# Patient Record
Sex: MALE | NOT HISPANIC OR LATINO | Employment: UNEMPLOYED | URBAN - METROPOLITAN AREA
[De-identification: names, ages, dates, MRNs, and addresses within clinical notes are randomized per-mention and may not be internally consistent; named-entity substitution may affect disease eponyms.]

---

## 2018-03-10 ENCOUNTER — RESOLUTE PROFESSIONAL BILLING HOSPITAL PROF FEE (OUTPATIENT)
Dept: HOSPITALIST | Facility: MEDICAL CENTER | Age: 28
End: 2018-03-10
Payer: MEDICAID

## 2018-03-10 ENCOUNTER — HOSPITAL ENCOUNTER (INPATIENT)
Facility: MEDICAL CENTER | Age: 28
LOS: 1 days | DRG: 189 | End: 2018-03-11
Attending: EMERGENCY MEDICINE | Admitting: FAMILY MEDICINE
Payer: MEDICAID

## 2018-03-10 ENCOUNTER — APPOINTMENT (OUTPATIENT)
Dept: RADIOLOGY | Facility: MEDICAL CENTER | Age: 28
DRG: 189 | End: 2018-03-10
Attending: EMERGENCY MEDICINE
Payer: MEDICAID

## 2018-03-10 DIAGNOSIS — J45.41 MODERATE PERSISTENT ASTHMA WITH ACUTE EXACERBATION: ICD-10-CM

## 2018-03-10 PROBLEM — D72.829 LEUKOCYTOSIS: Status: ACTIVE | Noted: 2018-03-10

## 2018-03-10 PROBLEM — E87.6 HYPOKALEMIA: Status: ACTIVE | Noted: 2018-03-10

## 2018-03-10 PROBLEM — J45.901 ASTHMA EXACERBATION: Status: ACTIVE | Noted: 2018-03-10

## 2018-03-10 PROBLEM — R60.0 BILATERAL LEG EDEMA: Status: ACTIVE | Noted: 2018-03-10

## 2018-03-10 PROBLEM — J96.01 ACUTE RESPIRATORY FAILURE WITH HYPOXIA (HCC): Status: ACTIVE | Noted: 2018-03-10

## 2018-03-10 LAB
ALBUMIN SERPL BCP-MCNC: 4.4 G/DL (ref 3.2–4.9)
ALBUMIN/GLOB SERPL: 1.2 G/DL
ALP SERPL-CCNC: 75 U/L (ref 30–99)
ALT SERPL-CCNC: 31 U/L (ref 2–50)
ANION GAP SERPL CALC-SCNC: 8 MMOL/L (ref 0–11.9)
AST SERPL-CCNC: 25 U/L (ref 12–45)
BASOPHILS # BLD AUTO: 0.6 % (ref 0–1.8)
BASOPHILS # BLD: 0.09 K/UL (ref 0–0.12)
BILIRUB SERPL-MCNC: 0.8 MG/DL (ref 0.1–1.5)
BNP SERPL-MCNC: 17 PG/ML (ref 0–100)
BUN SERPL-MCNC: 12 MG/DL (ref 8–22)
CALCIUM SERPL-MCNC: 9.1 MG/DL (ref 8.4–10.2)
CHLORIDE SERPL-SCNC: 104 MMOL/L (ref 96–112)
CO2 SERPL-SCNC: 24 MMOL/L (ref 20–33)
CREAT SERPL-MCNC: 0.84 MG/DL (ref 0.5–1.4)
EOSINOPHIL # BLD AUTO: 1.7 K/UL (ref 0–0.51)
EOSINOPHIL NFR BLD: 12 % (ref 0–6.9)
ERYTHROCYTE [DISTWIDTH] IN BLOOD BY AUTOMATED COUNT: 40.1 FL (ref 35.9–50)
FLUAV RNA SPEC QL NAA+PROBE: NEGATIVE
FLUBV RNA SPEC QL NAA+PROBE: NEGATIVE
GLOBULIN SER CALC-MCNC: 3.6 G/DL (ref 1.9–3.5)
GLUCOSE SERPL-MCNC: 121 MG/DL (ref 65–99)
HCT VFR BLD AUTO: 47 % (ref 42–52)
HGB BLD-MCNC: 16.3 G/DL (ref 14–18)
IMM GRANULOCYTES # BLD AUTO: 0.03 K/UL (ref 0–0.11)
IMM GRANULOCYTES NFR BLD AUTO: 0.2 % (ref 0–0.9)
LYMPHOCYTES # BLD AUTO: 1.83 K/UL (ref 1–4.8)
LYMPHOCYTES NFR BLD: 12.9 % (ref 22–41)
MCH RBC QN AUTO: 28.3 PG (ref 27–33)
MCHC RBC AUTO-ENTMCNC: 34.7 G/DL (ref 33.7–35.3)
MCV RBC AUTO: 81.6 FL (ref 81.4–97.8)
MONOCYTES # BLD AUTO: 1.04 K/UL (ref 0–0.85)
MONOCYTES NFR BLD AUTO: 7.4 % (ref 0–13.4)
NEUTROPHILS # BLD AUTO: 9.45 K/UL (ref 1.82–7.42)
NEUTROPHILS NFR BLD: 66.9 % (ref 44–72)
NRBC # BLD AUTO: 0 K/UL
NRBC BLD-RTO: 0 /100 WBC
PLATELET # BLD AUTO: 316 K/UL (ref 164–446)
PMV BLD AUTO: 9.2 FL (ref 9–12.9)
POTASSIUM SERPL-SCNC: 3.1 MMOL/L (ref 3.6–5.5)
PROCALCITONIN SERPL-MCNC: <0.05 NG/ML
PROT SERPL-MCNC: 8 G/DL (ref 6–8.2)
RBC # BLD AUTO: 5.76 M/UL (ref 4.7–6.1)
SODIUM SERPL-SCNC: 136 MMOL/L (ref 135–145)
TSH SERPL DL<=0.005 MIU/L-ACNC: 1.33 UIU/ML (ref 0.38–5.33)
WBC # BLD AUTO: 14.1 K/UL (ref 4.8–10.8)

## 2018-03-10 PROCEDURE — 84443 ASSAY THYROID STIM HORMONE: CPT

## 2018-03-10 PROCEDURE — A9270 NON-COVERED ITEM OR SERVICE: HCPCS | Performed by: FAMILY MEDICINE

## 2018-03-10 PROCEDURE — 96374 THER/PROPH/DIAG INJ IV PUSH: CPT

## 2018-03-10 PROCEDURE — 700111 HCHG RX REV CODE 636 W/ 250 OVERRIDE (IP): Performed by: FAMILY MEDICINE

## 2018-03-10 PROCEDURE — 96361 HYDRATE IV INFUSION ADD-ON: CPT

## 2018-03-10 PROCEDURE — 700111 HCHG RX REV CODE 636 W/ 250 OVERRIDE (IP): Performed by: EMERGENCY MEDICINE

## 2018-03-10 PROCEDURE — 99223 1ST HOSP IP/OBS HIGH 75: CPT | Performed by: FAMILY MEDICINE

## 2018-03-10 PROCEDURE — 700101 HCHG RX REV CODE 250: Performed by: EMERGENCY MEDICINE

## 2018-03-10 PROCEDURE — 84145 PROCALCITONIN (PCT): CPT

## 2018-03-10 PROCEDURE — 700105 HCHG RX REV CODE 258: Performed by: EMERGENCY MEDICINE

## 2018-03-10 PROCEDURE — 700102 HCHG RX REV CODE 250 W/ 637 OVERRIDE(OP): Performed by: FAMILY MEDICINE

## 2018-03-10 PROCEDURE — 700101 HCHG RX REV CODE 250: Performed by: FAMILY MEDICINE

## 2018-03-10 PROCEDURE — 87502 INFLUENZA DNA AMP PROBE: CPT

## 2018-03-10 PROCEDURE — 71045 X-RAY EXAM CHEST 1 VIEW: CPT

## 2018-03-10 PROCEDURE — 99285 EMERGENCY DEPT VISIT HI MDM: CPT

## 2018-03-10 PROCEDURE — 770020 HCHG ROOM/CARE - TELE (206)

## 2018-03-10 PROCEDURE — 94640 AIRWAY INHALATION TREATMENT: CPT

## 2018-03-10 PROCEDURE — 80053 COMPREHEN METABOLIC PANEL: CPT

## 2018-03-10 PROCEDURE — 83880 ASSAY OF NATRIURETIC PEPTIDE: CPT

## 2018-03-10 PROCEDURE — 85025 COMPLETE CBC W/AUTO DIFF WBC: CPT

## 2018-03-10 PROCEDURE — 304561 HCHG STAT O2

## 2018-03-10 RX ORDER — METHYLPREDNISOLONE SODIUM SUCCINATE 125 MG/2ML
125 INJECTION, POWDER, LYOPHILIZED, FOR SOLUTION INTRAMUSCULAR; INTRAVENOUS EVERY 6 HOURS
Status: DISCONTINUED | OUTPATIENT
Start: 2018-03-10 | End: 2018-03-11 | Stop reason: HOSPADM

## 2018-03-10 RX ORDER — AMOXICILLIN 250 MG
2 CAPSULE ORAL 2 TIMES DAILY
Status: DISCONTINUED | OUTPATIENT
Start: 2018-03-10 | End: 2018-03-11 | Stop reason: HOSPADM

## 2018-03-10 RX ORDER — ONDANSETRON 2 MG/ML
4 INJECTION INTRAMUSCULAR; INTRAVENOUS EVERY 4 HOURS PRN
Status: DISCONTINUED | OUTPATIENT
Start: 2018-03-10 | End: 2018-03-11 | Stop reason: HOSPADM

## 2018-03-10 RX ORDER — ONDANSETRON 4 MG/1
4 TABLET, ORALLY DISINTEGRATING ORAL EVERY 4 HOURS PRN
Status: DISCONTINUED | OUTPATIENT
Start: 2018-03-10 | End: 2018-03-11 | Stop reason: HOSPADM

## 2018-03-10 RX ORDER — POTASSIUM CHLORIDE 20 MEQ/1
20 TABLET, EXTENDED RELEASE ORAL DAILY
Status: DISCONTINUED | OUTPATIENT
Start: 2018-03-10 | End: 2018-03-11 | Stop reason: HOSPADM

## 2018-03-10 RX ORDER — ACETAMINOPHEN 325 MG/1
650 TABLET ORAL EVERY 6 HOURS PRN
Status: DISCONTINUED | OUTPATIENT
Start: 2018-03-10 | End: 2018-03-11 | Stop reason: HOSPADM

## 2018-03-10 RX ORDER — SODIUM CHLORIDE 9 MG/ML
1000 INJECTION, SOLUTION INTRAVENOUS ONCE
Status: COMPLETED | OUTPATIENT
Start: 2018-03-10 | End: 2018-03-10

## 2018-03-10 RX ORDER — PROMETHAZINE HYDROCHLORIDE 25 MG/1
12.5-25 SUPPOSITORY RECTAL EVERY 4 HOURS PRN
Status: DISCONTINUED | OUTPATIENT
Start: 2018-03-10 | End: 2018-03-11 | Stop reason: HOSPADM

## 2018-03-10 RX ORDER — IPRATROPIUM BROMIDE AND ALBUTEROL SULFATE 2.5; .5 MG/3ML; MG/3ML
3 SOLUTION RESPIRATORY (INHALATION)
Status: DISCONTINUED | OUTPATIENT
Start: 2018-03-10 | End: 2018-03-11 | Stop reason: HOSPADM

## 2018-03-10 RX ORDER — ACETAMINOPHEN 500 MG
1000 TABLET ORAL EVERY 6 HOURS PRN
COMMUNITY
End: 2018-04-24

## 2018-03-10 RX ORDER — BISACODYL 10 MG
10 SUPPOSITORY, RECTAL RECTAL
Status: DISCONTINUED | OUTPATIENT
Start: 2018-03-10 | End: 2018-03-11 | Stop reason: HOSPADM

## 2018-03-10 RX ORDER — METHYLPREDNISOLONE SODIUM SUCCINATE 125 MG/2ML
125 INJECTION, POWDER, LYOPHILIZED, FOR SOLUTION INTRAMUSCULAR; INTRAVENOUS ONCE
Status: COMPLETED | OUTPATIENT
Start: 2018-03-10 | End: 2018-03-10

## 2018-03-10 RX ORDER — PROMETHAZINE HYDROCHLORIDE 25 MG/1
12.5-25 TABLET ORAL EVERY 4 HOURS PRN
Status: DISCONTINUED | OUTPATIENT
Start: 2018-03-10 | End: 2018-03-11 | Stop reason: HOSPADM

## 2018-03-10 RX ORDER — POLYETHYLENE GLYCOL 3350 17 G/17G
1 POWDER, FOR SOLUTION ORAL
Status: DISCONTINUED | OUTPATIENT
Start: 2018-03-10 | End: 2018-03-11 | Stop reason: HOSPADM

## 2018-03-10 RX ADMIN — IPRATROPIUM BROMIDE 0.5 MG: 0.5 SOLUTION RESPIRATORY (INHALATION) at 09:59

## 2018-03-10 RX ADMIN — METHYLPREDNISOLONE SODIUM SUCCINATE 125 MG: 125 INJECTION, POWDER, FOR SOLUTION INTRAMUSCULAR; INTRAVENOUS at 17:24

## 2018-03-10 RX ADMIN — IPRATROPIUM BROMIDE AND ALBUTEROL SULFATE 3 ML: .5; 3 SOLUTION RESPIRATORY (INHALATION) at 19:30

## 2018-03-10 RX ADMIN — ALBUTEROL SULFATE 10 MG/HR: 5 SOLUTION RESPIRATORY (INHALATION) at 10:00

## 2018-03-10 RX ADMIN — ALBUTEROL SULFATE 2.5 MG: 2.5 SOLUTION RESPIRATORY (INHALATION) at 11:30

## 2018-03-10 RX ADMIN — SODIUM CHLORIDE 1000 ML: 9 INJECTION, SOLUTION INTRAVENOUS at 10:24

## 2018-03-10 RX ADMIN — POTASSIUM CHLORIDE 20 MEQ: 1500 TABLET, EXTENDED RELEASE ORAL at 14:11

## 2018-03-10 RX ADMIN — IPRATROPIUM BROMIDE AND ALBUTEROL SULFATE 3 ML: .5; 3 SOLUTION RESPIRATORY (INHALATION) at 15:03

## 2018-03-10 RX ADMIN — DOCUSATE SODIUM AND SENNOSIDES 2 TABLET: 8.6; 5 TABLET, FILM COATED ORAL at 20:44

## 2018-03-10 RX ADMIN — METHYLPREDNISOLONE SODIUM SUCCINATE 125 MG: 125 INJECTION, POWDER, FOR SOLUTION INTRAMUSCULAR; INTRAVENOUS at 10:24

## 2018-03-10 ASSESSMENT — PAIN SCALES - GENERAL: PAINLEVEL_OUTOF10: 0

## 2018-03-10 ASSESSMENT — COPD QUESTIONNAIRES
HAVE YOU SMOKED AT LEAST 100 CIGARETTES IN YOUR ENTIRE LIFE: NO/DON'T KNOW
DO YOU EVER COUGH UP ANY MUCUS OR PHLEGM?: NO/ONLY WITH OCCASIONAL COLDS OR INFECTIONS
COPD SCREENING SCORE: 0
DURING THE PAST 4 WEEKS HOW MUCH DID YOU FEEL SHORT OF BREATH: NONE/LITTLE OF THE TIME

## 2018-03-10 ASSESSMENT — ENCOUNTER SYMPTOMS
FEVER: 0
SHORTNESS OF BREATH: 1
HEARTBURN: 0
COUGH: 0
CHILLS: 0
WHEEZING: 1
VOMITING: 0
DIZZINESS: 0
SPUTUM PRODUCTION: 0
ABDOMINAL PAIN: 0
WEAKNESS: 0
DIARRHEA: 0
SORE THROAT: 0
BLURRED VISION: 0
NAUSEA: 0
STRIDOR: 0

## 2018-03-10 ASSESSMENT — LIFESTYLE VARIABLES
TOTAL SCORE: 0
ON A TYPICAL DAY WHEN YOU DRINK ALCOHOL HOW MANY DRINKS DO YOU HAVE: 1
EVER_SMOKED: NEVER
HAVE PEOPLE ANNOYED YOU BY CRITICIZING YOUR DRINKING: NO
EVER_SMOKED: NEVER
TOTAL SCORE: 0
ALCOHOL_USE: YES
CONSUMPTION TOTAL: NEGATIVE
AVERAGE NUMBER OF DAYS PER WEEK YOU HAVE A DRINK CONTAINING ALCOHOL: 1
TOTAL SCORE: 0
EVER HAD A DRINK FIRST THING IN THE MORNING TO STEADY YOUR NERVES TO GET RID OF A HANGOVER: NO
HOW MANY TIMES IN THE PAST YEAR HAVE YOU HAD 5 OR MORE DRINKS IN A DAY: 0
EVER FELT BAD OR GUILTY ABOUT YOUR DRINKING: NO
HAVE YOU EVER FELT YOU SHOULD CUT DOWN ON YOUR DRINKING: NO

## 2018-03-10 ASSESSMENT — PATIENT HEALTH QUESTIONNAIRE - PHQ9
2. FEELING DOWN, DEPRESSED, IRRITABLE, OR HOPELESS: NOT AT ALL
1. LITTLE INTEREST OR PLEASURE IN DOING THINGS: NOT AT ALL
SUM OF ALL RESPONSES TO PHQ9 QUESTIONS 1 AND 2: 0
SUM OF ALL RESPONSES TO PHQ QUESTIONS 1-9: 0

## 2018-03-10 NOTE — ED PROVIDER NOTES
ED Provider Note    CHIEF COMPLAINT  Chief Complaint   Patient presents with   • Shortness of Breath     in the mornings for 2 days, HX of asthma, took INH today without relief   • Headache     in the morning on waking for 2 days        HPI  Micky Doherty is a 27 y.o. male who presents to the ED with complaints of shortness of breath. Over the past 2 days, been having increasing shortness of breath, especially when he wakes up in the morning. Patient does have a distant history of asthma. Does have a single her inhaler, but it does not seem to be helping him. Patient presents today because of increasing shortness breath. Denies any fevers, chills, just feels very short of breath, is having a hard time breathing. Patient denies any other symptoms. Presents for evaluation.    REVIEW OF SYSTEMS  See HPI for further details. All other systems are negative.     PAST MEDICAL HISTORY  Past Medical History:   Diagnosis Date   • Asthma        FAMILY HISTORY  History reviewed. No pertinent family history.  Patient's family history has been discussed and is been found to be noncontributory to his present illness  SOCIAL HISTORY  Social History     Social History   • Marital status: Single     Spouse name: N/A   • Number of children: N/A   • Years of education: N/A     Social History Main Topics   • Smoking status: Never Smoker   • Smokeless tobacco: Former User     Quit date: 3/13/2014   • Alcohol use Yes      Comment: occasional   • Drug use: No   • Sexual activity: Not on file     Other Topics Concern   • Not on file     Social History Narrative   • No narrative on file      Pcp Pt States None  The patient denies any significant tobacco, alcohol or drug use    SURGICAL HISTORY  Past Surgical History:   Procedure Laterality Date   • OTHER  2006    no tear production   • OTHER      tonsillectomy       CURRENT MEDICATIONS  Home Medications     Reviewed by Janette Malloy (Pharmacy Tech) on 03/10/18 at 0901  Med List  "Status: Complete   Medication Last Dose Status   acetaminophen (TYLENOL) 500 MG Tab 3/9/2018 Active   NON SPECIFIED 3/10/2018 Active                ALLERGIES  No Known Allergies    PHYSICAL EXAM  VITAL SIGNS: /72   Pulse 94   Temp 37.5 °C (99.5 °F)   Resp (!) 24   Ht 1.753 m (5' 9\")   Wt 85.7 kg (188 lb 15 oz)   SpO2 94%   BMI 27.90 kg/m²    Pulse Oximetry was obtained. It showed a reading of Pulse Oximetry: (!) 85 %.  I interpreted this as hypoxic at 85%.     Constitutional: Well developed, Well nourished, No acute distress, Non-toxic appearance.   HENT: Normocephalic, Atraumatic, Bilateral external ears normal, bilateral tympanic membranes normal, Oropharynx moist, No oral exudates, Nose normal.   Eyes: Pupils are equal round and react to light, extraocular motions are intact, conjunctiva is normal, there are no signs of exudate.   Neck: Supple, no cervical lymphadenopathy, no meningeal signs..   Lymphatic: No lymphadenopathy noted.   Cardiovascular: Heart rate regular rhythm without murmurs, gallops or rubs  Thorax & Lungs: Diminished throughout with extra wheezes throughout.  Abdomen: Soft, nontender, nondistended. Bowel sounds are present  Skin: Warm, Dry, No erythema,   Back: No tenderness, No CVA tenderness.   Extremities: Intact distal pulses, no clubbing, no cyanosis, no edema, nontender.. Negative Homans sign  Neurologic: Alert & oriented x 3, Normal motor function, Normal sensory function, No focal deficits noted.       RADIOLOGY/PROCEDURES  DX-CHEST-PORTABLE (1 VIEW)   Final Result      No evidence of acute cardiopulmonary process.          Results for orders placed or performed during the hospital encounter of 03/10/18   CBC w/ Differential   Result Value Ref Range    WBC 14.1 (H) 4.8 - 10.8 K/uL    RBC 5.76 4.70 - 6.10 M/uL    Hemoglobin 16.3 14.0 - 18.0 g/dL    Hematocrit 47.0 42.0 - 52.0 %    MCV 81.6 81.4 - 97.8 fL    MCH 28.3 27.0 - 33.0 pg    MCHC 34.7 33.7 - 35.3 g/dL    RDW 40.1 " 35.9 - 50.0 fL    Platelet Count 316 164 - 446 K/uL    MPV 9.2 9.0 - 12.9 fL    Neutrophils-Polys 66.90 44.00 - 72.00 %    Lymphocytes 12.90 (L) 22.00 - 41.00 %    Monocytes 7.40 0.00 - 13.40 %    Eosinophils 12.00 (H) 0.00 - 6.90 %    Basophils 0.60 0.00 - 1.80 %    Immature Granulocytes 0.20 0.00 - 0.90 %    Nucleated RBC 0.00 /100 WBC    Neutrophils (Absolute) 9.45 (H) 1.82 - 7.42 K/uL    Lymphs (Absolute) 1.83 1.00 - 4.80 K/uL    Monos (Absolute) 1.04 (H) 0.00 - 0.85 K/uL    Eos (Absolute) 1.70 (H) 0.00 - 0.51 K/uL    Baso (Absolute) 0.09 0.00 - 0.12 K/uL    Immature Granulocytes (abs) 0.03 0.00 - 0.11 K/uL    NRBC (Absolute) 0.00 K/uL   Complete Metabolic Panel (CMP)   Result Value Ref Range    Sodium 136 135 - 145 mmol/L    Potassium 3.1 (L) 3.6 - 5.5 mmol/L    Chloride 104 96 - 112 mmol/L    Co2 24 20 - 33 mmol/L    Anion Gap 8.0 0.0 - 11.9    Glucose 121 (H) 65 - 99 mg/dL    Bun 12 8 - 22 mg/dL    Creatinine 0.84 0.50 - 1.40 mg/dL    Calcium 9.1 8.4 - 10.2 mg/dL    AST(SGOT) 25 12 - 45 U/L    ALT(SGPT) 31 2 - 50 U/L    Alkaline Phosphatase 75 30 - 99 U/L    Total Bilirubin 0.8 0.1 - 1.5 mg/dL    Albumin 4.4 3.2 - 4.9 g/dL    Total Protein 8.0 6.0 - 8.2 g/dL    Globulin 3.6 (H) 1.9 - 3.5 g/dL    A-G Ratio 1.2 g/dL   ESTIMATED GFR   Result Value Ref Range    GFR If African American >60 >60 mL/min/1.73 m 2    GFR If Non African American >60 >60 mL/min/1.73 m 2           COURSE & MEDICAL DECISION MAKING  Pertinent Labs & Imaging studies reviewed. (See chart for details)  Patient presents for evaluation. Clinically, the patient is rather tachypneic and very tight on examination. The patient started with continues to less treatment of albuterol with one dose of Atrovent. She also started with a liter bolus of fluids because he is dehydrated based on his tachycardia as well as his dry mucous membranes. After the fluid bolus, steroids, and initial treatment. He states he is feeling improved, but oxygen saturations  still remained about 87-88. Lung examination, he still has significant extra wheezes throughout. At this point, I do for the patient will require admission to the hospital for further treatment and care.    FINAL IMPRESSION  1. Moderate persistent asthma with acute exacerbation             Electronically signed by: Tanner Foley, 3/10/2018 9:45 AM

## 2018-03-10 NOTE — ED NOTES
"Chief Complaint   Patient presents with   • Shortness of Breath     in the mornings for 2 days, HX of asthma, took INH today without relief   • Headache     in the morning on waking for 2 days     /72   Pulse (!) 101   Temp 37.5 °C (99.5 °F)   Resp (!) 22   Ht 1.753 m (5' 9\")   Wt 85.7 kg (188 lb 15 oz)   SpO2 (!) 85%   BMI 27.90 kg/m²     "

## 2018-03-10 NOTE — ED NOTES
The Medication Reconciliation process has been completed by interviewing the patient    Allergies have been reviewed  Antibiotic use in 30 days - none  Home Pharmacy:  [facility] - [place]

## 2018-03-10 NOTE — H&P
Hospital Medicine History and Physical    Date of Service  3/10/2018    Chief Complaint  Chief Complaint   Patient presents with   • Shortness of Breath     in the mornings for 2 days, HX of asthma, took INH today without relief   • Headache     in the morning on waking for 2 days       History of Presenting Illness  27 y.o. male who presented 3/10/2018 with Shortness of breath and wheezing for the past 2-3 days. He denies any fever or chills, cough or congestion, chest pain, abdominal pain nausea or vomiting or diarrhea. Patient states he did not get the flu shot this year. Influenza is still pending at the spine. He has known history of asthma, he has been using his rescue inhaler almost every 4-6 hours for the past 2-3 days without any relief. Prior to that patient states he has been using his inhaler every day especially during the winter season and for the past 6 months. He has no history of hospitalization for asthma exacerbation or any history of intubation or ICU admission. His oxygen saturation was noted to be 86% room air.    Primary Care Physician  Pcp Pt States None    Consultants  None    Code Status  Full    Review of Systems  Review of Systems   Constitutional: Negative for chills, fever and malaise/fatigue.   HENT: Negative for hearing loss and sore throat.    Eyes: Negative for blurred vision.   Respiratory: Positive for shortness of breath and wheezing. Negative for cough, sputum production and stridor.    Cardiovascular: Negative for chest pain and leg swelling.   Gastrointestinal: Negative for abdominal pain, diarrhea, heartburn, nausea and vomiting.   Genitourinary: Negative for dysuria.   Neurological: Negative for dizziness and weakness.        Past Medical History  Past Medical History:   Diagnosis Date   • Asthma        Surgical History  Past Surgical History:   Procedure Laterality Date   • OTHER  2006    no tear production   • OTHER      tonsillectomy       Medications  No current  facility-administered medications on file prior to encounter.      No current outpatient prescriptions on file prior to encounter.       Family History  Family History   Problem Relation Age of Onset   • Family history unknown: Yes       Social History  Social History   Substance Use Topics   • Smoking status: Never Smoker   • Smokeless tobacco: Former User     Quit date: 3/13/2014   • Alcohol use Yes      Comment: occasional       Allergies  No Known Allergies     Physical Exam  Laboratory   Hemodynamics  Temp (24hrs), Av.5 °C (99.5 °F), Min:37.5 °C (99.5 °F), Max:37.5 °C (99.5 °F)   Temperature: 37.5 °C (99.5 °F)  Pulse  Av.3  Min: 92  Max: 103    Blood Pressure: 130/72, NIBP: 123/80      Respiratory      Respiration: (!) 24, Pulse Oximetry: 90 %, O2 Daily Delivery Respiratory : Room Air with O2 Available     Given By:: Mouthpiece, #Bronchodilator: Yes, Work Of Breathing / Effort: Accessory Muscle Use;Increased Work of Breathing  RUL Breath Sounds: Expiratory Wheezes, RML Breath Sounds: Expiratory Wheezes, RLL Breath Sounds: Diminished, LEN Breath Sounds: Expiratory Wheezes, LLL Breath Sounds: Expiratory Wheezes    Physical Exam   Constitutional: He is oriented to person, place, and time. He appears well-developed and well-nourished.   HENT:   Head: Normocephalic and atraumatic.   Eyes: Conjunctivae are normal. Pupils are equal, round, and reactive to light.   Neck: No tracheal deviation present. No thyromegaly present.   Cardiovascular: Normal rate and regular rhythm.    Pulmonary/Chest: Accessory muscle usage present. He has decreased breath sounds. He has wheezes in the right upper field, the right middle field, the right lower field, the left upper field, the left middle field and the left lower field. He has no rales.   Abdominal: Soft. Bowel sounds are normal. He exhibits no distension. There is no tenderness.   Musculoskeletal: He exhibits edema.   Lymphadenopathy:     He has no cervical  adenopathy.   Neurological: He is alert and oriented to person, place, and time.   Skin: Skin is warm and dry.   Nursing note and vitals reviewed.      Recent Labs      03/10/18   0945   WBC  14.1*   RBC  5.76   HEMOGLOBIN  16.3   HEMATOCRIT  47.0   MCV  81.6   MCH  28.3   MCHC  34.7   RDW  40.1   PLATELETCT  316   MPV  9.2     Recent Labs      03/10/18   0945   SODIUM  136   POTASSIUM  3.1*   CHLORIDE  104   CO2  24   GLUCOSE  121*   BUN  12   CREATININE  0.84   CALCIUM  9.1     Recent Labs      03/10/18   0945   ALTSGPT  31   ASTSGOT  25   ALKPHOSPHAT  75   TBILIRUBIN  0.8   GLUCOSE  121*                 No results found for: TROPONINI  Urinalysis:    Lab Results  Component Value Date/Time   SPECGRAVITY 1.025 04/03/2013 1545   GLUCOSEUR Negative 04/03/2013 1545   KETONES >=80 (A) 04/03/2013 1545   NITRITE Negative 04/03/2013 1545   WBCURINE 2-5 (A) 04/03/2013 1545   BACTERIA Few (A) 04/03/2013 1545   EPITHELCELL Few 04/03/2013 1545        Imaging    CXR  No evidence of acute cardiopulmonary process   Assessment/Plan     I anticipate this patient will require at least two midnights for appropriate medical management, necessitating inpatient admission.    Acute respiratory failure with hypoxia (CMS-HCC)- (present on admission)   Assessment & Plan    Keep O2 sats above 90%  Encourage incentive spirometer        Asthma exacerbation- (present on admission)   Assessment & Plan    IV Solu-Medrol, RT protocol  Likely need maintenance inhaler        Bilateral leg edema- (present on admission)   Assessment & Plan    Check BNP, echocardiogram        Hypokalemia- (present on admission)   Assessment & Plan    Start K Dur, follow BMP, magnesium        Leukocytosis- (present on admission)   Assessment & Plan    Check pro calcitonin            VTE prophylaxis: Lovenox.

## 2018-03-10 NOTE — CARE PLAN
Problem: Oxygenation:  Goal: Maintain adequate oxygenation dependent on patient condition  Outcome: PROGRESSING AS EXPECTED  Monitor oxygenation for SpO2 >90%  Intervention: Manage oxygen therapy by monitoring pulse oximetry and/or ABG values  Oxygen is currently required at 3 lpm nasal cannula for SpO2 >90%  Intervention: Levels of oxygenation will improve to baseline  Patient did not require oxygen at home prior to this admission.       Problem: Bronchoconstriction:  Goal: Improve in air movement and diminished wheezing  Outcome: PROGRESSING AS EXPECTED  Patient does claim a benefit from bronchodilator therapy. There was a increase in aeration and a decrease in wheezes heard  Intervention: Implement inhaled treatments  Patient is receiving Duoneb Q4.  Intervention: Evaluate and manage medication effects  Patient will be evaluated before and after medication delivery to assess effectiveness of therapy. Currently there seems to be both subjective and objective improvement.

## 2018-03-10 NOTE — ED NOTES
Pt scored 3 on sepsis criteria, MD at bedside to evaluate. MD feels this is asthma exacerbation, pt non-febrile. Declines sepsis work-up at this time.

## 2018-03-10 NOTE — PROGRESS NOTES
Pt to floor.  Pt is alert and oriented.  Denies SOB at this time.  Pt currently on 3 L NC.  Updated pt w/ POC.  Water given.  Will continue with care.

## 2018-03-11 VITALS
RESPIRATION RATE: 20 BRPM | BODY MASS INDEX: 27.98 KG/M2 | SYSTOLIC BLOOD PRESSURE: 126 MMHG | DIASTOLIC BLOOD PRESSURE: 72 MMHG | WEIGHT: 188.93 LBS | HEIGHT: 69 IN | TEMPERATURE: 97.5 F | HEART RATE: 108 BPM | OXYGEN SATURATION: 92 %

## 2018-03-11 LAB
ANION GAP SERPL CALC-SCNC: 8 MMOL/L (ref 0–11.9)
BASOPHILS # BLD AUTO: 0.1 % (ref 0–1.8)
BASOPHILS # BLD: 0.01 K/UL (ref 0–0.12)
BUN SERPL-MCNC: 14 MG/DL (ref 8–22)
CALCIUM SERPL-MCNC: 9.5 MG/DL (ref 8.4–10.2)
CHLORIDE SERPL-SCNC: 111 MMOL/L (ref 96–112)
CO2 SERPL-SCNC: 22 MMOL/L (ref 20–33)
CREAT SERPL-MCNC: 0.68 MG/DL (ref 0.5–1.4)
EOSINOPHIL # BLD AUTO: 0 K/UL (ref 0–0.51)
EOSINOPHIL NFR BLD: 0 % (ref 0–6.9)
ERYTHROCYTE [DISTWIDTH] IN BLOOD BY AUTOMATED COUNT: 41.9 FL (ref 35.9–50)
GLUCOSE SERPL-MCNC: 156 MG/DL (ref 65–99)
HCT VFR BLD AUTO: 44.6 % (ref 42–52)
HGB BLD-MCNC: 15.3 G/DL (ref 14–18)
IMM GRANULOCYTES # BLD AUTO: 0.05 K/UL (ref 0–0.11)
IMM GRANULOCYTES NFR BLD AUTO: 0.5 % (ref 0–0.9)
LV EJECT FRACT  99904: 70
LV EJECT FRACT MOD 2C 99903: 64.39
LV EJECT FRACT MOD 4C 99902: 78.45
LV EJECT FRACT MOD BP 99901: 71.64
LYMPHOCYTES # BLD AUTO: 0.8 K/UL (ref 1–4.8)
LYMPHOCYTES NFR BLD: 7.4 % (ref 22–41)
MAGNESIUM SERPL-MCNC: 2.1 MG/DL (ref 1.5–2.5)
MCH RBC QN AUTO: 28.5 PG (ref 27–33)
MCHC RBC AUTO-ENTMCNC: 34.3 G/DL (ref 33.7–35.3)
MCV RBC AUTO: 83.1 FL (ref 81.4–97.8)
MONOCYTES # BLD AUTO: 0.11 K/UL (ref 0–0.85)
MONOCYTES NFR BLD AUTO: 1 % (ref 0–13.4)
NEUTROPHILS # BLD AUTO: 9.79 K/UL (ref 1.82–7.42)
NEUTROPHILS NFR BLD: 91 % (ref 44–72)
NRBC # BLD AUTO: 0 K/UL
NRBC BLD-RTO: 0 /100 WBC
PLATELET # BLD AUTO: 338 K/UL (ref 164–446)
PMV BLD AUTO: 9.8 FL (ref 9–12.9)
POTASSIUM SERPL-SCNC: 4.1 MMOL/L (ref 3.6–5.5)
RBC # BLD AUTO: 5.37 M/UL (ref 4.7–6.1)
SODIUM SERPL-SCNC: 141 MMOL/L (ref 135–145)
WBC # BLD AUTO: 10.8 K/UL (ref 4.8–10.8)

## 2018-03-11 PROCEDURE — 36415 COLL VENOUS BLD VENIPUNCTURE: CPT

## 2018-03-11 PROCEDURE — 700101 HCHG RX REV CODE 250: Performed by: FAMILY MEDICINE

## 2018-03-11 PROCEDURE — 80048 BASIC METABOLIC PNL TOTAL CA: CPT

## 2018-03-11 PROCEDURE — 99239 HOSP IP/OBS DSCHRG MGMT >30: CPT | Performed by: INTERNAL MEDICINE

## 2018-03-11 PROCEDURE — 93306 TTE W/DOPPLER COMPLETE: CPT | Mod: 26 | Performed by: INTERNAL MEDICINE

## 2018-03-11 PROCEDURE — 93306 TTE W/DOPPLER COMPLETE: CPT

## 2018-03-11 PROCEDURE — 85025 COMPLETE CBC W/AUTO DIFF WBC: CPT

## 2018-03-11 PROCEDURE — 94760 N-INVAS EAR/PLS OXIMETRY 1: CPT

## 2018-03-11 PROCEDURE — 700102 HCHG RX REV CODE 250 W/ 637 OVERRIDE(OP): Performed by: INTERNAL MEDICINE

## 2018-03-11 PROCEDURE — 83735 ASSAY OF MAGNESIUM: CPT

## 2018-03-11 PROCEDURE — 700111 HCHG RX REV CODE 636 W/ 250 OVERRIDE (IP): Performed by: FAMILY MEDICINE

## 2018-03-11 PROCEDURE — 94640 AIRWAY INHALATION TREATMENT: CPT

## 2018-03-11 PROCEDURE — A9270 NON-COVERED ITEM OR SERVICE: HCPCS | Performed by: INTERNAL MEDICINE

## 2018-03-11 RX ORDER — FLUTICASONE PROPIONATE 220 UG/1
1 AEROSOL, METERED RESPIRATORY (INHALATION)
Status: DISCONTINUED | OUTPATIENT
Start: 2018-03-11 | End: 2018-03-11 | Stop reason: HOSPADM

## 2018-03-11 RX ORDER — PREDNISONE 10 MG/1
TABLET ORAL
Qty: 36 TAB | Refills: 0 | Status: SHIPPED | OUTPATIENT
Start: 2018-03-11 | End: 2018-04-24

## 2018-03-11 RX ORDER — FLUTICASONE PROPIONATE 220 UG/1
1 AEROSOL, METERED RESPIRATORY (INHALATION) EVERY 12 HOURS
Qty: 1 INHALER | Refills: 2 | Status: SHIPPED | OUTPATIENT
Start: 2018-03-11 | End: 2018-03-11

## 2018-03-11 RX ORDER — FLUTICASONE PROPIONATE 220 UG/1
1 AEROSOL, METERED RESPIRATORY (INHALATION) EVERY 12 HOURS
Qty: 1 INHALER | Refills: 2 | Status: SHIPPED | OUTPATIENT
Start: 2018-03-11

## 2018-03-11 RX ORDER — ALBUTEROL SULFATE 90 UG/1
2 AEROSOL, METERED RESPIRATORY (INHALATION) EVERY 6 HOURS PRN
Qty: 8.5 G | Refills: 0 | Status: SHIPPED | OUTPATIENT
Start: 2018-03-11 | End: 2018-04-24

## 2018-03-11 RX ORDER — BUDESONIDE AND FORMOTEROL FUMARATE DIHYDRATE 80; 4.5 UG/1; UG/1
2 AEROSOL RESPIRATORY (INHALATION)
Status: DISCONTINUED | OUTPATIENT
Start: 2018-03-11 | End: 2018-03-11

## 2018-03-11 RX ADMIN — METHYLPREDNISOLONE SODIUM SUCCINATE 125 MG: 125 INJECTION, POWDER, FOR SOLUTION INTRAMUSCULAR; INTRAVENOUS at 06:21

## 2018-03-11 RX ADMIN — METHYLPREDNISOLONE SODIUM SUCCINATE 125 MG: 125 INJECTION, POWDER, FOR SOLUTION INTRAMUSCULAR; INTRAVENOUS at 00:02

## 2018-03-11 RX ADMIN — METHYLPREDNISOLONE SODIUM SUCCINATE 125 MG: 125 INJECTION, POWDER, FOR SOLUTION INTRAMUSCULAR; INTRAVENOUS at 12:14

## 2018-03-11 RX ADMIN — IPRATROPIUM BROMIDE AND ALBUTEROL SULFATE 3 ML: .5; 3 SOLUTION RESPIRATORY (INHALATION) at 15:45

## 2018-03-11 RX ADMIN — IPRATROPIUM BROMIDE AND ALBUTEROL SULFATE 3 ML: .5; 3 SOLUTION RESPIRATORY (INHALATION) at 07:32

## 2018-03-11 RX ADMIN — IPRATROPIUM BROMIDE AND ALBUTEROL SULFATE 3 ML: .5; 3 SOLUTION RESPIRATORY (INHALATION) at 11:01

## 2018-03-11 ASSESSMENT — PAIN SCALES - GENERAL: PAINLEVEL_OUTOF10: 0

## 2018-03-11 NOTE — FLOWSHEET NOTE
03/10/18 1931   Events/Summary/Plan   Events/Summary/Plan SVN   Interdisciplinary Plan of Care-Goals (Indications)   Obstructive Ventilatory Defect or Pulmonary Disease without Obvious Obstruction History / Diagnosis   Interdisciplinary Plan of Care-Outcomes    Bronchodilator Outcome Patient at Stable Baseline;Improved Patient Appearance with Decreased use of Accessory Muscles   Education   Education Yes - Pt. / Family has been Instructed in use of Respiratory Equipment;Yes - Pt. / Family has been Instructed in use of Respiratory Medications and Adverse Reactions   RT Assessment of Delivered Medications   Evaluation of Medication Delivery Daily Yes-- Pt /Family has been Instructed in use of Respiratory Medications and Adverse Reactions   SVN Group   #SVN Performed Yes   Given By: Mouthpiece   Chest Exam   Respiration 18   Pulse (!) 107   Breath Sounds   Pre/Post Intervention Post Intervention Assessment   RUL Breath Sounds Inspiratory Wheezes;Expiratory Wheezes   RML Breath Sounds Inspiratory Wheezes;Expiratory Wheezes   RLL Breath Sounds Inspiratory Wheezes;Expiratory Wheezes   LEN Breath Sounds Inspiratory Wheezes;Expiratory Wheezes   LLL Breath Sounds Inspiratory Wheezes;Expiratory Wheezes   Oximetry   Continuous Oximetry Yes   Oxygen   Pulse Oximetry 91 %   O2 (LPM) 3   O2 Daily Delivery Respiratory  Silicone Nasal Cannula

## 2018-03-11 NOTE — PROGRESS NOTES
Tele Summary @ 2747    Rhythm : Sinus Tachycardia  Ectopy : none  HR : 93  NE : 0.20  QRS : 0.10  QT : 0.40    Any further monitoring will be don by patient's Primary Nurse

## 2018-03-11 NOTE — FLOWSHEET NOTE
03/11/18 0349   Therapy Not Performed   Type of Therapy Not Performed SVN   Reason Therapy Not Performed Refused   Oxygen   Pulse Oximetry 96 %   O2 (LPM) 3   O2 Daily Delivery Respiratory  Silicone Nasal Cannula

## 2018-03-11 NOTE — FLOWSHEET NOTE
03/11/18 1546   Interdisciplinary Plan of Care-Goals (Indications)   Obstructive Ventilatory Defect or Pulmonary Disease without Obvious Obstruction History / Diagnosis   Interdisciplinary Plan of Care-Outcomes    Bronchodilator Outcome Patient at Stable Baseline   RT Assessment of Delivered Medications   Evaluation of Medication Delivery Daily Yes-- Pt /Family has been Instructed in use of Respiratory Medications and Adverse Reactions   SVN Group   #SVN Performed Yes   Given By: Mouthpiece   Respiratory WDL   Respiratory (WDL) X   Chest Exam   Respiration 20   Pulse (!) 108   Breath Sounds   Pre/Post Intervention Pre Intervention Assessment   RUL Breath Sounds Inspiratory Wheezes   RML Breath Sounds Diminished;Inspiratory Wheezes   RLL Breath Sounds Diminished;Inspiratory Wheezes   LEN Breath Sounds Inspiratory Wheezes   LLL Breath Sounds Diminished;Inspiratory Wheezes   Oximetry   #Pulse Oximetry (Single Determination) Yes   Oxygen   Pulse Oximetry 92 %   O2 (LPM) 3   O2 Daily Delivery Respiratory  Silicone Nasal Cannula

## 2018-03-11 NOTE — CARE PLAN
Problem: Venous Thromboembolism (VTW)/Deep Vein Thrombosis (DVT) Prevention:  Goal: Patient will participate in Venous Thrombosis (VTE)/Deep Vein Thrombosis (DVT)Prevention Measures   03/10/18 2100   OTHER   Risk Assessment Score 0   VTE RISK Low   Pharmacologic Prophylaxis Used LMWH: Enoxaparin(Lovenox)       Problem: Respiratory:  Goal: Respiratory status will improve  Outcome: PROGRESSING AS EXPECTED  Pt still on 3L of oxygen via nasal cannula.   Scheduled RT and steroid in place  Plan to wean pt off oxygen & continue to monitor

## 2018-03-11 NOTE — PROGRESS NOTES
Received report from day shift nurse. Assumed pt care at 1915. Pt is A&Ox4, resting comfortably in bed. Pt on 3L of oxygen via nasal cannula. No signs of SOB/respiratory distress. Labs noted, VSS. Assessment completed, inspiratory and expiratory wheezes noted to x6 lung lobes. Pt denied pain. Fall precautions in place. Bed locked & at lowest position. Call light and personal belongings within reach, encouraged pt to call for any assisatance. Continue to monitor

## 2018-03-11 NOTE — FLOWSHEET NOTE
03/11/18 0734   Interdisciplinary Plan of Care-Goals (Indications)   Obstructive Ventilatory Defect or Pulmonary Disease without Obvious Obstruction History / Diagnosis   Interdisciplinary Plan of Care-Outcomes    Bronchodilator Outcome Patient at Stable Baseline   RT Assessment of Delivered Medications   Evaluation of Medication Delivery Daily Yes-- Pt /Family has been Instructed in use of Respiratory Medications and Adverse Reactions   SVN Group   #SVN Performed Yes   Given By: Mouthpiece   Date SVN Last Changed 03/10/18   Date SVN Next Change Due (Q 7 Days) 03/17/18   Respiratory WDL   Respiratory (WDL) X   Chest Exam   Respiration 20   Pulse 84   Breath Sounds   Pre/Post Intervention Pre Intervention Assessment   RUL Breath Sounds Expiratory Wheezes;Inspiratory Wheezes   RML Breath Sounds Expiratory Wheezes;Inspiratory Wheezes   RLL Breath Sounds Diminished;Expiratory Wheezes;Inspiratory Wheezes   LEN Breath Sounds Expiratory Wheezes;Inspiratory Wheezes   LLL Breath Sounds Diminished;Expiratory Wheezes;Inspiratory Wheezes   Oximetry   #Pulse Oximetry (Single Determination) Yes   Oxygen   Home O2 Use Prior To Admission? No   Pulse Oximetry 90 %   O2 (LPM) 3   O2 Daily Delivery Respiratory  Silicone Nasal Cannula

## 2018-03-11 NOTE — PROGRESS NOTES
Received report from night RN.  Assumed pt care.  Pt is alert and oriented.  No signs of distress.  Will continue with care.

## 2018-03-11 NOTE — FLOWSHEET NOTE
03/11/18 1102   Interdisciplinary Plan of Care-Goals (Indications)   Obstructive Ventilatory Defect or Pulmonary Disease without Obvious Obstruction History / Diagnosis   Interdisciplinary Plan of Care-Outcomes    Bronchodilator Outcome Patient at Stable Baseline   RT Assessment of Delivered Medications   Evaluation of Medication Delivery Daily Yes-- Pt /Family has been Instructed in use of Respiratory Medications and Adverse Reactions   SVN Group   #SVN Performed Yes   Given By: Mouthpiece   Respiratory WDL   Respiratory (WDL) X   Chest Exam   Respiration 16   Pulse 100   Breath Sounds   Pre/Post Intervention Post Intervention Assessment   RUL Breath Sounds Inspiratory Wheezes   RML Breath Sounds Inspiratory Wheezes   RLL Breath Sounds Inspiratory Wheezes   LEN Breath Sounds Inspiratory Wheezes   LLL Breath Sounds Inspiratory Wheezes   Oximetry   #Pulse Oximetry (Single Determination) Yes   Oxygen   Pulse Oximetry 94 %   O2 (LPM) 3   O2 Daily Delivery Respiratory  Silicone Nasal Cannula

## 2018-03-12 NOTE — DISCHARGE SUMMARY
CHIEF COMPLAINT ON ADMISSION  Chief Complaint   Patient presents with   • Shortness of Breath     in the mornings for 2 days, HX of asthma, took INH today without relief   • Headache     in the morning on waking for 2 days       CODE STATUS  Prior    HPI & HOSPITAL COURSE  This is a 27 y.o. male here with shortness of breath and wheezing for the past 2-3 days, secondary to asthma exacerbation. For details see H&P note.  Patient was treated for asthma exacerbation with IV steroids and breathing treatments with subjective improvement. His oxygen saturation improved. She was started on  steroid inhalers, for moderate persistent asthma, based on his history of daily asthma attacks. 2nd days of admission, patient still had expiratory wheezes and his O2 saturation was not quite at baseline. I recommended patient to stay until further improvement, however patient had commitments outside the hospital and he decided to leave AMA. I recommended him to follow up with his PCP and get pulmonary evaluation for his asthma    Patient left AMA. he was capable of making his own decisions      SPECIFIC OUTPATIENT FOLLOW-UP  pcp    DISCHARGE PROBLEM LIST  Active Problems:    Asthma exacerbation POA: Yes    Acute respiratory failure with hypoxia (CMS-HCC) POA: Yes    Leukocytosis POA: Yes    Hypokalemia POA: Yes    Bilateral leg edema POA: Yes  Resolved Problems:    * No resolved hospital problems. *      FOLLOW UP  Future Appointments  Date Time Provider Department Center   3/21/2018 10:00 AM Sahron Henley M.D. [facility] None     Pcp Pt States None            MEDICATIONS ON DISCHARGE   Micky Green   Home Medication Instructions MARTINEZ:[id number]    Printed on:03/12/18 0735   Medication Information                      acetaminophen (TYLENOL) 500 MG Tab  Take 1,000 mg by mouth every 6 hours as needed.             albuterol 108 (90 Base) MCG/ACT Aero Soln inhalation aerosol  Inhale 2 Puffs by mouth every 6 hours as needed for  Shortness of Breath.             fluticasone (FLOVENT HFA) 220 MCG/ACT Aerosol  Inhale 1 Puff by mouth every 12 hours.             predniSONE (DELTASONE) 10 MG Tab  40mg PO QD x 5 days, then 20mg PO QD x 5 days, then 10 mg PO QD x 6 days                 DIET  No orders of the defined types were placed in this encounter.      ACTIVITY  As tolerated.      CONSULTATIONS  none    PROCEDURES  none    LABORATORY  Lab Results   Component Value Date/Time    SODIUM 141 03/11/2018 04:51 AM    POTASSIUM 4.1 03/11/2018 04:51 AM    CHLORIDE 111 03/11/2018 04:51 AM    CO2 22 03/11/2018 04:51 AM    GLUCOSE 156 (H) 03/11/2018 04:51 AM    BUN 14 03/11/2018 04:51 AM    CREATININE 0.68 03/11/2018 04:51 AM        Lab Results   Component Value Date/Time    WBC 10.8 03/11/2018 04:51 AM    HEMOGLOBIN 15.3 03/11/2018 04:51 AM    HEMATOCRIT 44.6 03/11/2018 04:51 AM    PLATELETCT 338 03/11/2018 04:51 AM      ECHOCARDIOGRAM COMP W/O CONT   Final Result      DX-CHEST-PORTABLE (1 VIEW)   Final Result      No evidence of acute cardiopulmonary process.            Total time of the discharge process exceeds 45 minutes

## 2018-03-12 NOTE — PROGRESS NOTES
Micky Doherty patient has chosen to leave the hospital against medical advice. The attending physician has not discharged the patient. Patient is not a risk to himself or others. I have discussed with the patient the following:  Physician has not determined patient is ready for discharge, Risks and consequences of leaving the hospital too soon and Benefit of continued hospitalization.      Discharge against medical advice form has been Signed.      Attending physician has been notified.

## 2018-03-21 ENCOUNTER — OFFICE VISIT (OUTPATIENT)
Dept: INTERNAL MEDICINE | Facility: MEDICAL CENTER | Age: 28
End: 2018-03-21
Payer: MEDICAID

## 2018-03-21 VITALS
WEIGHT: 199 LBS | SYSTOLIC BLOOD PRESSURE: 117 MMHG | OXYGEN SATURATION: 95 % | HEART RATE: 70 BPM | TEMPERATURE: 97.8 F | BODY MASS INDEX: 27.86 KG/M2 | DIASTOLIC BLOOD PRESSURE: 76 MMHG | HEIGHT: 71 IN

## 2018-03-21 DIAGNOSIS — Z82.49 FAMILY HISTORY OF BRUGADA SYNDROME: ICD-10-CM

## 2018-03-21 DIAGNOSIS — R94.31 NONSPECIFIC ABNORMAL ELECTROCARDIOGRAM (ECG) (EKG): ICD-10-CM

## 2018-03-21 DIAGNOSIS — D72.810 LYMPHOPENIA: ICD-10-CM

## 2018-03-21 DIAGNOSIS — J45.41 MODERATE PERSISTENT ASTHMA WITH EXACERBATION: ICD-10-CM

## 2018-03-21 PROBLEM — D72.829 LEUKOCYTOSIS: Status: RESOLVED | Noted: 2018-03-10 | Resolved: 2018-03-21

## 2018-03-21 PROBLEM — J96.01 ACUTE RESPIRATORY FAILURE WITH HYPOXIA (HCC): Status: RESOLVED | Noted: 2018-03-10 | Resolved: 2018-03-21

## 2018-03-21 PROBLEM — E87.6 HYPOKALEMIA: Status: RESOLVED | Noted: 2018-03-10 | Resolved: 2018-03-21

## 2018-03-21 PROBLEM — R60.0 BILATERAL LEG EDEMA: Status: RESOLVED | Noted: 2018-03-10 | Resolved: 2018-03-21

## 2018-03-21 PROCEDURE — 94640 AIRWAY INHALATION TREATMENT: CPT | Mod: 59 | Performed by: INTERNAL MEDICINE

## 2018-03-21 PROCEDURE — 99204 OFFICE O/P NEW MOD 45 MIN: CPT | Mod: GC,25 | Performed by: INTERNAL MEDICINE

## 2018-03-21 PROCEDURE — 93000 ELECTROCARDIOGRAM COMPLETE: CPT | Mod: GC | Performed by: INTERNAL MEDICINE

## 2018-03-21 RX ORDER — ALBUTEROL SULFATE 2.5 MG/3ML
2.5 SOLUTION RESPIRATORY (INHALATION) ONCE
Status: COMPLETED | OUTPATIENT
Start: 2018-03-21 | End: 2018-03-21

## 2018-03-21 RX ORDER — ALBUTEROL SULFATE 2.5 MG/3ML
2.5 SOLUTION RESPIRATORY (INHALATION) EVERY 4 HOURS PRN
Qty: 30 BULLET | Refills: 3 | Status: SHIPPED | OUTPATIENT
Start: 2018-03-21 | End: 2018-04-24

## 2018-03-21 RX ADMIN — ALBUTEROL SULFATE 2.5 MG: 2.5 SOLUTION RESPIRATORY (INHALATION) at 11:05

## 2018-03-21 ASSESSMENT — ENCOUNTER SYMPTOMS
WEAKNESS: 0
MUSCULOSKELETAL NEGATIVE: 1
CHILLS: 0
SHORTNESS OF BREATH: 1
WHEEZING: 1
GASTROINTESTINAL NEGATIVE: 1
COUGH: 0
SORE THROAT: 0
DIZZINESS: 0
DIAPHORESIS: 0
FEVER: 0
PALPITATIONS: 0
EYES NEGATIVE: 1
DEPRESSION: 0
LOSS OF CONSCIOUSNESS: 0
SINUS PAIN: 0
HEADACHES: 0

## 2018-03-21 ASSESSMENT — LIFESTYLE VARIABLES: SUBSTANCE_ABUSE: 0

## 2018-03-21 NOTE — PROGRESS NOTES
New Patient to Establish    Reason to establish: Hospital follow-up    CC: shortness of breath    HPI: Patient is a pleasant 27-year-old man who comes in for hospital follow-up after being admitted for asthma exacerbation  (tested negative for the flu). He was never diagnosed with asthma as a child, but many people in his family have asthma. Patient left AMA after a couple days of being admitted. He had not fully recovered when he left. He was discharged with 16 days of prednisone taper, along with albuterol PRN and fluticasone inhaler. He states that he has not been using his inhaler since discharge, only taking the prednisone tablets. He does endorse slight improvement since discharge, but continued mild symptoms. He states 2 days ago he woke up from sleep with SOB, but not since then - total 2 times this week.    Family history of Brugada syndrome: Patient is worried that he also has Brugada syndrome, and is asking for EKG.     Past medical history: Multiple ear infections as a child and left tear duct block with repair/stent most likely - patient can't remember exactly what was done.    Patient Active Problem List    Diagnosis Date Noted   • Asthma exacerbation 03/10/2018     Priority: High   • Family history of Brugada syndrome 03/21/2018       Past Medical History:   Diagnosis Date   • Asthma    • Defect of lacrimal duct     in childhood - stent placed - left side       Current Outpatient Prescriptions   Medication Sig Dispense Refill   • albuterol (PROVENTIL) 2.5mg/3ml Nebu Soln solution for nebulization 3 mL by Nebulization route every four hours as needed for Shortness of Breath. 30 Bullet 3   • predniSONE (DELTASONE) 10 MG Tab 40mg PO QD x 5 days, then 20mg PO QD x 5 days, then 10 mg PO QD x 6 days 36 Tab 0   • albuterol 108 (90 Base) MCG/ACT Aero Soln inhalation aerosol Inhale 2 Puffs by mouth every 6 hours as needed for Shortness of Breath. 8.5 g 0   • fluticasone (FLOVENT HFA) 220 MCG/ACT Aerosol Inhale  "1 Puff by mouth every 12 hours. 1 Inhaler 2   • acetaminophen (TYLENOL) 500 MG Tab Take 1,000 mg by mouth every 6 hours as needed.       Current Facility-Administered Medications   Medication Dose Route Frequency Provider Last Rate Last Dose   • albuterol (PROVENTIL) 2.5mg/3ml nebulizer solution 2.5 mg  2.5 mg Nebulization Once Sharon Henley M.D.           Allergies as of 03/21/2018   • (No Known Allergies)       Social History     Social History   • Marital status: Single     Spouse name: N/A   • Number of children: N/A   • Years of education: N/A     Occupational History   • Not on file.     Social History Main Topics   • Smoking status: Never Smoker   • Smokeless tobacco: Former User     Quit date: 3/13/2014   • Alcohol use Yes      Comment: occasional   • Drug use: No   • Sexual activity: Not on file     Other Topics Concern   • Not on file     Social History Narrative   • No narrative on file       Family History   Problem Relation Age of Onset   • Stroke Paternal Uncle        Past Surgical History:   Procedure Laterality Date   • OTHER  2006    no tear production   • OTHER      tonsillectomy       ROS: As per HPI. Additional pertinent symptoms as noted below.    Review of Systems   Constitutional: Negative for chills, diaphoresis, fever and malaise/fatigue.   HENT: Negative for congestion, ear pain, hearing loss, sinus pain and sore throat.    Eyes: Negative.    Respiratory: Positive for shortness of breath and wheezing. Negative for cough.    Cardiovascular: Negative for chest pain, palpitations and leg swelling.   Gastrointestinal: Negative.    Genitourinary: Negative.    Musculoskeletal: Negative.    Skin: Negative.    Neurological: Negative for dizziness, loss of consciousness, weakness and headaches.   Psychiatric/Behavioral: Negative for depression and substance abuse.        No current tobacco or marijuana       /76   Pulse 70   Temp 36.6 °C (97.8 °F)   Ht 1.791 m (5' 10.5\")   Wt 90.3 kg " (199 lb)   SpO2 95%   BMI 28.15 kg/m²     Physical Exam  General:  Alert and oriented, No apparent distress. In mild respiratory distress. Audible inspiratory wheeze without stethoscope.    Eyes: Pupils equal and round. No scleral icterus or injection.    Throat: Clear no erythema or exudates noted.    Neck: Supple. No lymphadenopathy noted.     Lungs: Diffuse moderate inspiratory and expiratory wheezes, no crackles. After nebulizing treatment only mild inspiratory and expiratory wheezes. Was not using accessory muscles.     Cardiovascular: Regular rate and rhythm. No murmurs, rubs or gallops.    Abdomen:  Benign. No rebound or guarding noted.    Extremities: No clubbing, cyanosis, edema.    Skin: Clear. No rash or suspicious skin lesions noted.      Assessment and Plan    #Moderate persistent asthma with exacerbation  -Woke up twice this week with SOB - although he has not been using his inhalers...  -Initially on exam he had moderate insp/exp wheezes, no crackles  - Given one treatment with nebulized Albuterol Sulfate - patient's diffuse expiratory/respiratory wheezes improved - nearly resolved.  -Instructed to use his particular his own inhaler every 12 hours, and to use albuterol when needed for wheezes for the next several weeks.  -Ordered DME nebulizer along with albuterol solution - instructed to use this instead of his inhaler when he is at home for now.   -Discussed using humidifier in his bedroom, as he states it is there he feels more respiratory distress.  -Follow up in 5 weeks    #Family history of Brugada syndrome  #Nonspecific abnormal electrocardiogram (ECG) (EKG)  -3/21/18 --> 2 EKGs done: Left axis deviation. Peaked Twave on only one sinus beat (only on one of the EKGs) in V2 - no peaked T waves in any other beats or leads on either EKG.  -This is nonspecific, but ordered K level for follow-through.   -He denies any chest discomfort, palpitations  -3/18 ECHO EF 70% - no abnormality seen      #Lymphopenia  - on second CBC drawn while in hospital - 1st CBC was wnl  -likely due to high/predominant neutrophil count during asthma exacerbation   -HIV test ordered     #H/o marijuana use  -last used 1 month ago.     Prevention  -Discuss vaccines during next visit    Followup: Return in about 5 weeks (around 4/25/2018), or if symptoms worsen or fail to improve.    Signed by: Sharon Henley M.D.

## 2018-03-21 NOTE — PATIENT INSTRUCTIONS
Flovent please use every 12 hours - can use 1 to 2 puffs depending on how you feel. Please use every 12 hours.    Albuterol inhaler use every 6 hours if needed until you no longer feel ANY wheezes. No one should be able to hear you breath.    Complete the prednisone tablets as previously instructed.      See you in 5 weeks.     Please get the labs drawn now.

## 2018-04-24 ENCOUNTER — HOSPITAL ENCOUNTER (EMERGENCY)
Facility: MEDICAL CENTER | Age: 28
End: 2018-04-24
Attending: EMERGENCY MEDICINE
Payer: MEDICAID

## 2018-04-24 VITALS
HEIGHT: 71 IN | OXYGEN SATURATION: 95 % | RESPIRATION RATE: 15 BRPM | SYSTOLIC BLOOD PRESSURE: 124 MMHG | HEART RATE: 97 BPM | DIASTOLIC BLOOD PRESSURE: 64 MMHG | BODY MASS INDEX: 28.09 KG/M2 | TEMPERATURE: 97.3 F | WEIGHT: 200.62 LBS

## 2018-04-24 DIAGNOSIS — J45.901 MODERATE ASTHMA WITH ACUTE EXACERBATION, UNSPECIFIED WHETHER PERSISTENT: ICD-10-CM

## 2018-04-24 DIAGNOSIS — J45.41 MODERATE PERSISTENT ASTHMA WITH EXACERBATION: ICD-10-CM

## 2018-04-24 PROCEDURE — 700111 HCHG RX REV CODE 636 W/ 250 OVERRIDE (IP): Performed by: EMERGENCY MEDICINE

## 2018-04-24 PROCEDURE — 700101 HCHG RX REV CODE 250: Performed by: EMERGENCY MEDICINE

## 2018-04-24 PROCEDURE — 94640 AIRWAY INHALATION TREATMENT: CPT

## 2018-04-24 PROCEDURE — 94760 N-INVAS EAR/PLS OXIMETRY 1: CPT

## 2018-04-24 PROCEDURE — 99284 EMERGENCY DEPT VISIT MOD MDM: CPT

## 2018-04-24 PROCEDURE — 96365 THER/PROPH/DIAG IV INF INIT: CPT

## 2018-04-24 RX ORDER — PREDNISONE 20 MG/1
TABLET ORAL
Status: COMPLETED
Start: 2018-04-24 | End: 2018-04-24

## 2018-04-24 RX ORDER — MAGNESIUM SULFATE HEPTAHYDRATE 40 MG/ML
2 INJECTION, SOLUTION INTRAVENOUS ONCE
Status: COMPLETED | OUTPATIENT
Start: 2018-04-24 | End: 2018-04-24

## 2018-04-24 RX ORDER — IPRATROPIUM BROMIDE AND ALBUTEROL SULFATE 2.5; .5 MG/3ML; MG/3ML
3 SOLUTION RESPIRATORY (INHALATION)
Status: DISCONTINUED | OUTPATIENT
Start: 2018-04-24 | End: 2018-04-24 | Stop reason: HOSPADM

## 2018-04-24 RX ORDER — PREDNISONE 20 MG/1
60 TABLET ORAL DAILY
Status: DISCONTINUED | OUTPATIENT
Start: 2018-04-24 | End: 2018-04-24 | Stop reason: HOSPADM

## 2018-04-24 RX ORDER — ALBUTEROL SULFATE 2.5 MG/3ML
2.5 SOLUTION RESPIRATORY (INHALATION) EVERY 4 HOURS PRN
Qty: 30 BULLET | Refills: 3 | Status: SHIPPED | OUTPATIENT
Start: 2018-04-24

## 2018-04-24 RX ORDER — PREDNISONE 20 MG/1
20 TABLET ORAL DAILY
Qty: 5 TAB | Refills: 0 | Status: SHIPPED | OUTPATIENT
Start: 2018-04-24 | End: 2018-04-29

## 2018-04-24 RX ORDER — ALBUTEROL SULFATE 90 UG/1
2 AEROSOL, METERED RESPIRATORY (INHALATION) EVERY 6 HOURS PRN
Qty: 8.5 G | Refills: 0 | Status: SHIPPED | OUTPATIENT
Start: 2018-04-24

## 2018-04-24 RX ADMIN — ALBUTEROL SULFATE 15 MG: 5 SOLUTION RESPIRATORY (INHALATION) at 05:05

## 2018-04-24 RX ADMIN — ALBUTEROL SULFATE 5 MG: 2.5 SOLUTION RESPIRATORY (INHALATION) at 04:25

## 2018-04-24 RX ADMIN — MAGNESIUM SULFATE HEPTAHYDRATE 2 G: 40 INJECTION, SOLUTION INTRAVENOUS at 05:09

## 2018-04-24 RX ADMIN — IPRATROPIUM BROMIDE AND ALBUTEROL SULFATE 3 ML: .5; 3 SOLUTION RESPIRATORY (INHALATION) at 03:42

## 2018-04-24 RX ADMIN — IPRATROPIUM BROMIDE AND ALBUTEROL SULFATE 3 ML: .5; 3 SOLUTION RESPIRATORY (INHALATION) at 03:30

## 2018-04-24 RX ADMIN — PREDNISONE 60 MG: 20 TABLET ORAL at 03:41

## 2018-04-24 ASSESSMENT — LIFESTYLE VARIABLES: EVER_SMOKED: NEVER

## 2018-04-24 NOTE — FLOWSHEET NOTE
04/24/18 0342   Interdisciplinary Plan of Care-Goals (Indications)   Bronchodilator Indications History / Diagnosis   Interdisciplinary Plan of Care-Outcomes    Bronchodilator Outcome Patient at Stable Baseline   Education   Education Yes - Pt. / Family has been Instructed in use of Respiratory Equipment   RT Assessment of Delivered Medications   Evaluation of Medication Delivery Daily Yes-- Pt /Family has been Instructed in use of Respiratory Medications and Adverse Reactions   SVN Group   #SVN Performed Yes   Given By: Mouthpiece   Respiratory WDL   Respiratory (WDL) X   Chest Exam   Respiration 15   Pulse 75   Breath Sounds   Pre/Post Intervention Pre Intervention Assessment   RUL Breath Sounds Diminished   RML Breath Sounds Diminished   RLL Breath Sounds Diminished   LEN Breath Sounds Diminished   LLL Breath Sounds Diminished   Oximetry   #Pulse Oximetry (Single Determination) Yes   Oxygen   Pulse Oximetry 93 %   O2 (LPM) 0   O2 (FiO2) 21   O2 Daily Delivery Respiratory  Room Air with O2 Available   Patient is clearing up a little more post treatment on 2nd treatment, patient states that they're feeling better.

## 2018-04-24 NOTE — ED NOTES
Pt still has insp/exp wheezes, low resting pulse ox 88% on Ra. Ed Md re-examining pt. Pending orders

## 2018-04-24 NOTE — FLOWSHEET NOTE
04/24/18 0330   Events/Summary/Plan   Events/Summary/Plan ER SVN   Interdisciplinary Plan of Care-Goals (Indications)   Bronchodilator Indications History / Diagnosis   Interdisciplinary Plan of Care-Outcomes    Bronchodilator Outcome Patient at Stable Baseline   Education   Education Yes - Pt. / Family has been Instructed in use of Respiratory Equipment   RT Assessment of Delivered Medications   Evaluation of Medication Delivery Daily Yes-- Pt /Family has been Instructed in use of Respiratory Medications and Adverse Reactions   SVN Group   #SVN Performed Yes   Given By: Mouthpiece   Date SVN Last Changed 04/24/18   Date SVN Next Change Due (Q 7 Days) 05/01/18   Respiratory WDL   Respiratory (WDL) X   Chest Exam   Respiration 18   Pulse 60   Breath Sounds   Pre/Post Intervention Pre Intervention Assessment   RUL Breath Sounds Expiratory Wheezes   RML Breath Sounds Expiratory Wheezes   RLL Breath Sounds Expiratory Wheezes   LEN Breath Sounds Expiratory Wheezes   LLL Breath Sounds Expiratory Wheezes   Oximetry   #Pulse Oximetry (Single Determination) Yes   Oxygen   Home O2 Use Prior To Admission? No   Pulse Oximetry 95 %   O2 (LPM) 0   O2 (FiO2) 21   O2 Daily Delivery Respiratory  Room Air with O2 Available   Increased aeration troughout, most of the wheezing has been resolved

## 2018-04-24 NOTE — FLOWSHEET NOTE
04/24/18 0428   Interdisciplinary Plan of Care-Goals (Indications)   Bronchodilator Indications History / Diagnosis   Interdisciplinary Plan of Care-Outcomes    Bronchodilator Outcome Patient at Stable Baseline   Education   Education Yes - Pt. / Family has been Instructed in use of Respiratory Equipment   RT Assessment of Delivered Medications   Evaluation of Medication Delivery Daily Yes-- Pt /Family has been Instructed in use of Respiratory Medications and Adverse Reactions   SVN Group   #SVN Performed Yes   Given By: Mouthpiece   Respiratory WDL   Respiratory (WDL) X   Chest Exam   Respiration 15   Pulse 70   Breath Sounds   Pre/Post Intervention Pre Intervention Assessment   RUL Breath Sounds Expiratory Wheezes   RML Breath Sounds Expiratory Wheezes   RLL Breath Sounds Expiratory Wheezes   LEN Breath Sounds Expiratory Wheezes   LLL Breath Sounds Expiratory Wheezes   Oximetry   #Pulse Oximetry (Single Determination) Yes   Oxygen   Home O2 Use Prior To Admission? No   Pulse Oximetry 91 %   O2 (LPM) 0   O2 (FiO2) 21   O2 Daily Delivery Respiratory  Room Air with O2 Available   Patient is still wheezing post treatment, but states that they feel great

## 2018-04-24 NOTE — FLOWSHEET NOTE
04/24/18 0505   Interdisciplinary Plan of Care-Goals (Indications)   Bronchodilator Indications History / Diagnosis   Interdisciplinary Plan of Care-Outcomes    Bronchodilator Outcome Patient at Stable Baseline   Education   Education Yes - Pt. / Family has been Instructed in use of Respiratory Medications and Adverse Reactions   RT Assessment of Delivered Medications   Evaluation of Medication Delivery Daily Yes-- Pt /Family has been Instructed in use of Respiratory Medications and Adverse Reactions   Continuous Nebulizer Group   Continuous Nebulizer Q 1 Hour Yes   #Bronchodilator Yes   Chest Exam   Respiration 15   Pulse 73   Breath Sounds   Pre/Post Intervention Post Intervention Assessment   RUL Breath Sounds Inspiratory Wheezes;Expiratory Wheezes   RML Breath Sounds Inspiratory Wheezes;Expiratory Wheezes   RLL Breath Sounds Inspiratory Wheezes;Expiratory Wheezes   LEN Breath Sounds Inspiratory Wheezes;Expiratory Wheezes   LLL Breath Sounds Inspiratory Wheezes;Expiratory Wheezes   Oximetry   #Pulse Oximetry (Single Determination) Yes   Oxygen   Home O2 Use Prior To Admission? No   Pulse Oximetry 89 %   O2 (LPM) 0   O2 (FiO2) 21   O2 Daily Delivery Respiratory  Room Air with O2 Available

## 2018-04-24 NOTE — ED PROVIDER NOTES
ED Provider Note    CHIEF COMPLAINT  Chief Complaint   Patient presents with   • Shortness of Breath       HPI  Micky Doherty is a 27 y.o. male who presents with wheezing and shortness of breath. Patient with a history of asthma and reports symptoms are identical to prior episodes of asthma. He reports mild runny nose and believes his symptoms are likely secondary to a cold. He denies any fevers or constitutional symptoms. He reports chest tightness which is identical to prior episodes of asthma. He denies any lower extremity edema    REVIEW OF SYSTEMS  ROS  See HPI for further details. All other systems are negative.     PAST MEDICAL HISTORY   has a past medical history of Asthma and Defect of lacrimal duct.    SOCIAL HISTORY  Social History     Social History Main Topics   • Smoking status: Never Smoker   • Smokeless tobacco: Former User     Quit date: 3/13/2014   • Alcohol use Yes      Comment: occasional   • Drug use: No   • Sexual activity: Not on file       SURGICAL HISTORY   has a past surgical history that includes other and other (2006).    CURRENT MEDICATIONS  Home Medications     Reviewed by Mamie Johnson R.N. (Registered Nurse) on 04/24/18 at 0325  Med List Status: Not Addressed   Medication Last Dose Status   albuterol (PROVENTIL) 2.5mg/3ml Nebu Soln solution for nebulization  Active   albuterol 108 (90 Base) MCG/ACT Aero Soln inhalation aerosol 4/23/2018 Active   fluticasone (FLOVENT HFA) 220 MCG/ACT Aerosol 4/23/2018 Active                ALLERGIES  No Known Allergies    PHYSICAL EXAM  Physical Exam   Constitutional: He is oriented to person, place, and time. He appears well-developed and well-nourished.   HENT:   Head: Normocephalic.   Eyes: Conjunctivae are normal. Pupils are equal, round, and reactive to light.   Neck: Normal range of motion. Neck supple.   Cardiovascular: Normal rate and regular rhythm.    Pulmonary/Chest: Effort normal. He has wheezes. He has no rales.   Diffuse  wheezing throughout   Abdominal: Soft. Bowel sounds are normal. He exhibits no distension. There is no tenderness. There is no rebound and no guarding.   Musculoskeletal: Normal range of motion.   Neurological: He is alert and oriented to person, place, and time.   Skin: Skin is warm and dry.         COURSE & MEDICAL DECISION MAKING  Pertinent Labs & Imaging studies reviewed. (See chart for details)  Patient here with diffuse wheezing throughout.  Patient's symptoms are consistent with asthma exacerbation. Recent echo was unremarkable.  Will treat accordingly. Giving steroid burst and albuterol.  Following 1st treatment patient's chest pain has entirely resolved but mild wheezing persists  Following 2nd treatment wheezing continues to improve persists. We'll repeat.  Following 3rd treatment pts wheezing remains, will give hour long and mag. If sxs persist will admit    5:43 AM  Pt sxs resolved. WOB nl, sat nl, wheezing resolved. Requesting discharge home. Return precautions discussed  Patient will be discharged home with refills of his albuterol nebulizer solution and MDI and a short prednisone burst.    The patient will not drink alcohol nor drive with prescribed medications. The patient will return for worsening symptoms and is stable at the time of discharge. The patient verbalizes understanding and will comply.    FINAL IMPRESSION  1. Asthma exacerbation         Electronically signed by: Mathew Underwood, 4/24/2018 3:54 AM

## 2018-04-24 NOTE — ED NOTES
Pt dc'd home with instructions to f/u with pcp and a pulmonologist, rx for steroid e-prescribed to pt's pharmacy of choice. Opportunity provided to ask questions, pt ambulated out of ed with steady gait.